# Patient Record
(demographics unavailable — no encounter records)

---

## 2025-03-24 NOTE — RISK ASSESSMENT
[Eats meals with family] : eats meals with family [Has family members/adults to turn to for help] : has family members/adults to turn to for help [Is permitted and is able to make independent decisions] : Is permitted and is able to make independent decisions [Grade: ____] : Grade: [unfilled] [Eats regular meals including adequate fruits and vegetables] : eats regular meals including adequate fruits and vegetables [Drinks non-sweetened liquids] : drinks non-sweetened liquids  [Calcium source] : calcium source [Has friends] : has friends [Has interests/participates in community activities/volunteers] : has interests/participates in community activities/volunteers [Home is free of violence] : home is free of violence [Uses safety belts/safety equipment] : uses safety belts/safety equipment  [Has peer relationships free of violence] : has peer relationships free of violence [Vaginal] : vaginal [Displays self-confidence] : displays self-confidence [Has problems with sleep] : has problems with sleep [With Teen] : teen [Has concerns about body or appearance] : does not have concerns about body or appearance [At least 1 hour of physical activity a day] : does not do at least 1 hour of physical activity a day [Screen time (except homework) less than 2 hours a day] : no screen time (except homework) less than 2 hours a day [Uses tobacco] : does not use tobacco [Uses drugs] : does not use drugs  [Drinks alcohol] : does not drink alcohol [Impaired/distracted driving] : no impaired/distracted driving [Gets depressed, anxious, or irritable/has mood swings] : does not get depressed, anxious, or irritable/has mood swings [Has thought about hurting self or considered suicide] : has not thought about hurting self or considered suicide [de-identified] : LIves with mother and siblings [de-identified] : [employer]; not doing well; Kazakh primary language [de-identified] : Enjoys playing and working with mechanics [de-identified] : Identifies as male; Interested in females only; Last SA: 3 days ago. Condom use: always; Currently  in a relationship. [de-identified] : Denies getting stressed

## 2025-03-24 NOTE — HISTORY OF PRESENT ILLNESS
[FreeTextEntry6] : 16 year old male presents to clinic today for vaccine administration. Pt arrived from  9-10 months ago. Arrived to visit without proof of childhood immunizations. Vaccine consent signed by mother on chart. Pt denies any adverse reactions to vaccines in the past. Pt feels well, denies any s/s of illness at present.

## 2025-03-24 NOTE — DISCUSSION/SUMMARY
[FreeTextEntry1] : 16 year old male presents to [facility] for [facility] administration. 1. Encounter for vaccine administration -The following vaccines were given to pt: HPV, Menactra, IPV, and Tdap. -Advised patient to apply cool compress or ice pack to arm if having pain, and use of OTC fever reducing agents such as Tylenol or Ibuprofen if he develops fever. -Updated copy of CIR provided to patient. -The following titers were sent to determine pt's immunity status: IgG MMRV and Hepatitis B surface antibody.  2.  -[facility] performed and reviewed with patient. No positive indicators noted. Anticipatory guidance provided.   Pt will RTC in 1 week to continue vaccine schedule. [] : The components of the vaccine(s) to be administered today are listed in the plan of care. The disease(s) for which the vaccine(s) are intended to prevent and the risks have been discussed with the caretaker.  The risks are also included in the appropriate vaccination information statements which have been provided to the patient's caregiver.  The caregiver has given consent to vaccinate.

## 2025-03-24 NOTE — BEGINNING OF VISIT
[Patient] : patient [] :  [Language Line ] : provided by [employer]   [Time Spent: ____ minutes] : Total time spent using  services: [unfilled] minutes. The patient's primary language is not English thus required  services. [Interpreters_IDNumber] : [id number] [Interpreters_FullName] : Jodi [TWNoteComboBox1] : [place]ankan

## 2025-05-02 NOTE — HISTORY OF PRESENT ILLNESS
[Tdap] : Tdap [Hepatitis A] : Hepatitis A [IPV] : IPV [MMR] : MMR [HPV] : HPV [FreeTextEntry1] : 16 year old male presents to clinic for vaccine administration. Feels well today. No complaints of illness at present CIR reviewed, student due for the following vaccines: flu, COVID, Tdap, IPV, MMR, HPV and Hep A Parental consent signed for student to receive Tdap, IPV, MMR, HPV and Hep A  Student denies any adverse events to vaccines in the past.

## 2025-05-02 NOTE — DISCUSSION/SUMMARY
[FreeTextEntry1] : 16 year old male presents for Tdap, IPV, MMR, HPV and Hep A vaccine administration Student tolerated vaccine administration well without incident Reviewed possible injection site tenderness; patient may apply cool compress or ice pack for tenderness Fever may develop within 24 hours, may utilize ibuprofen or Tylenol If fever persists past 24 hours may need to seek care for possible infection unrelated to vaccine  Return to clinic as needed for any further complaints [] : The components of the vaccine(s) to be administered today are listed in the plan of care. The disease(s) for which the vaccine(s) are intended to prevent and the risks have been discussed with the caretaker.  The risks are also included in the appropriate vaccination information statements which have been provided to the patient's caregiver.  The caregiver has given consent to vaccinate.